# Patient Record
Sex: FEMALE
[De-identification: names, ages, dates, MRNs, and addresses within clinical notes are randomized per-mention and may not be internally consistent; named-entity substitution may affect disease eponyms.]

---

## 2020-08-04 PROBLEM — Z00.00 ENCOUNTER FOR PREVENTIVE HEALTH EXAMINATION: Status: ACTIVE | Noted: 2020-08-04

## 2020-08-06 ENCOUNTER — APPOINTMENT (OUTPATIENT)
Dept: ORTHOPEDIC SURGERY | Facility: CLINIC | Age: 50
End: 2020-08-06
Payer: COMMERCIAL

## 2020-08-06 VITALS
WEIGHT: 120 LBS | SYSTOLIC BLOOD PRESSURE: 109 MMHG | DIASTOLIC BLOOD PRESSURE: 74 MMHG | HEART RATE: 61 BPM | HEIGHT: 69 IN | BODY MASS INDEX: 17.77 KG/M2 | TEMPERATURE: 98.2 F

## 2020-08-06 DIAGNOSIS — S64.495A: ICD-10-CM

## 2020-08-06 DIAGNOSIS — Z78.9 OTHER SPECIFIED HEALTH STATUS: ICD-10-CM

## 2020-08-06 DIAGNOSIS — Z80.3 FAMILY HISTORY OF MALIGNANT NEOPLASM OF BREAST: ICD-10-CM

## 2020-08-06 PROCEDURE — 99203 OFFICE O/P NEW LOW 30 MIN: CPT

## 2020-08-06 RX ORDER — LEVOTHYROXINE SODIUM 125 UG/1
125 TABLET ORAL
Refills: 0 | Status: ACTIVE | COMMUNITY

## 2020-08-06 NOTE — HISTORY OF PRESENT ILLNESS
[Right] : right hand dominant [FreeTextEntry1] : She comes in today for evaluation of a laceration to her left ring finger 2 weeks ago.  She went to the emergency room but because it was delayed, the laceration was not sutured.  She comes in today with complaints of numbness at the tip of her finger.

## 2020-08-06 NOTE — DISCUSSION/SUMMARY
[FreeTextEntry1] : She has findings consistent with a laceration to the left ring finger with an injury to 1 of the terminal branches of the radial digital nerve with numbness.\par \par I had a discussion regarding today's visit, the diagnosis, and treatment recommendations / options.  Because of the location of the laceration, which is quite distal, this is not amenable to surgical repair.  She was instructed on scar massage and desensitization.  I did tell that in most cases, the sensitivity improves with time.  She understands that she may have some residual numbness at the tip of the finger.  She will follow-up in 6 weeks if needed according to her symptoms.\par \par The patient has agreed to this plan of management and has expressed full understanding.  All questions were fully answered to the patient's satisfaction.\par \par Over 50% of the time spent with the patient was on counseling the patient on the above diagnosis, treatment plan and prognosis.

## 2020-08-06 NOTE — PHYSICAL EXAM
[de-identified] : - Constitutional: This is a female in no obvious distress.  \par - Psych: Patient is alert and oriented to person, place and time.  Patient has a normal mood and affect.\par - Cardiovascular: Normal pulses throughout the upper extremities.  No significant varicosities are noted in the upper extremities. \par - Neuro: Patient has normal coordination.\par - Respiratory:  Patient exhibits no evidence of shortness of breath or difficulty breathing.\par - Skin: No rashes, lesions, or other abnormalities are noted in the upper extremities.\par \par ---\par \par Examination of her left ring finger demonstrates a healing laceration along the radial aspect of the tip of the finger.  There is mild swelling.  There is tenderness and a positive Tinel sign.  She has some numbness along the radial tip of the finger.  Her flexor and extensor tendons are intact.  She has normal sensation along the ulnar digital nerve.